# Patient Record
Sex: MALE | Race: BLACK OR AFRICAN AMERICAN | NOT HISPANIC OR LATINO | Employment: FULL TIME | URBAN - METROPOLITAN AREA
[De-identification: names, ages, dates, MRNs, and addresses within clinical notes are randomized per-mention and may not be internally consistent; named-entity substitution may affect disease eponyms.]

---

## 2017-09-21 ENCOUNTER — OFFICE VISIT (OUTPATIENT)
Dept: INTERNAL MEDICINE | Facility: CLINIC | Age: 37
End: 2017-09-21
Payer: COMMERCIAL

## 2017-09-21 ENCOUNTER — LAB VISIT (OUTPATIENT)
Dept: LAB | Facility: HOSPITAL | Age: 37
End: 2017-09-21
Attending: FAMILY MEDICINE
Payer: COMMERCIAL

## 2017-09-21 VITALS
TEMPERATURE: 99 F | RESPIRATION RATE: 16 BRPM | HEART RATE: 92 BPM | HEIGHT: 74 IN | BODY MASS INDEX: 28.71 KG/M2 | WEIGHT: 223.75 LBS | DIASTOLIC BLOOD PRESSURE: 74 MMHG | SYSTOLIC BLOOD PRESSURE: 129 MMHG

## 2017-09-21 DIAGNOSIS — Z00.00 ROUTINE MEDICAL EXAM: Primary | ICD-10-CM

## 2017-09-21 DIAGNOSIS — Z00.00 ROUTINE MEDICAL EXAM: ICD-10-CM

## 2017-09-21 DIAGNOSIS — J45.40 MODERATE PERSISTENT ASTHMA WITHOUT COMPLICATION: ICD-10-CM

## 2017-09-21 LAB
ALBUMIN SERPL BCP-MCNC: 4 G/DL
ALP SERPL-CCNC: 85 U/L
ALT SERPL W/O P-5'-P-CCNC: 22 U/L
ANION GAP SERPL CALC-SCNC: 10 MMOL/L
AST SERPL-CCNC: 20 U/L
BASOPHILS # BLD AUTO: 0.04 K/UL
BASOPHILS NFR BLD: 0.6 %
BILIRUB SERPL-MCNC: 1 MG/DL
BUN SERPL-MCNC: 12 MG/DL
CALCIUM SERPL-MCNC: 9.3 MG/DL
CHLORIDE SERPL-SCNC: 106 MMOL/L
CHOLEST SERPL-MCNC: 182 MG/DL
CHOLEST/HDLC SERPL: 3.5 {RATIO}
CO2 SERPL-SCNC: 24 MMOL/L
CREAT SERPL-MCNC: 1 MG/DL
DIFFERENTIAL METHOD: NORMAL
EOSINOPHIL # BLD AUTO: 0.3 K/UL
EOSINOPHIL NFR BLD: 4.4 %
ERYTHROCYTE [DISTWIDTH] IN BLOOD BY AUTOMATED COUNT: 14.1 %
EST. GFR  (AFRICAN AMERICAN): >60 ML/MIN/1.73 M^2
EST. GFR  (NON AFRICAN AMERICAN): >60 ML/MIN/1.73 M^2
GLUCOSE SERPL-MCNC: 85 MG/DL
HCT VFR BLD AUTO: 47.2 %
HDLC SERPL-MCNC: 52 MG/DL
HDLC SERPL: 28.6 %
HGB BLD-MCNC: 15.6 G/DL
LDLC SERPL CALC-MCNC: 102.6 MG/DL
LYMPHOCYTES # BLD AUTO: 2.4 K/UL
LYMPHOCYTES NFR BLD: 35.7 %
MCH RBC QN AUTO: 27.6 PG
MCHC RBC AUTO-ENTMCNC: 33.1 G/DL
MCV RBC AUTO: 83 FL
MONOCYTES # BLD AUTO: 0.6 K/UL
MONOCYTES NFR BLD: 8.6 %
NEUTROPHILS # BLD AUTO: 3.4 K/UL
NEUTROPHILS NFR BLD: 50.4 %
NONHDLC SERPL-MCNC: 130 MG/DL
PLATELET # BLD AUTO: 252 K/UL
PMV BLD AUTO: 10.1 FL
POTASSIUM SERPL-SCNC: 4.1 MMOL/L
PROT SERPL-MCNC: 7.6 G/DL
RBC # BLD AUTO: 5.66 M/UL
SODIUM SERPL-SCNC: 140 MMOL/L
T4 FREE SERPL-MCNC: 0.95 NG/DL
TRIGL SERPL-MCNC: 137 MG/DL
TSH SERPL DL<=0.005 MIU/L-ACNC: 1.49 UIU/ML
WBC # BLD AUTO: 6.75 K/UL

## 2017-09-21 PROCEDURE — 36415 COLL VENOUS BLD VENIPUNCTURE: CPT | Mod: PO

## 2017-09-21 PROCEDURE — 84439 ASSAY OF FREE THYROXINE: CPT

## 2017-09-21 PROCEDURE — 80053 COMPREHEN METABOLIC PANEL: CPT

## 2017-09-21 PROCEDURE — 85025 COMPLETE CBC W/AUTO DIFF WBC: CPT

## 2017-09-21 PROCEDURE — 84443 ASSAY THYROID STIM HORMONE: CPT

## 2017-09-21 PROCEDURE — 99999 PR PBB SHADOW E&M-NEW PATIENT-LVL III: CPT | Mod: PBBFAC,,, | Performed by: FAMILY MEDICINE

## 2017-09-21 PROCEDURE — 80061 LIPID PANEL: CPT

## 2017-09-21 PROCEDURE — 99385 PREV VISIT NEW AGE 18-39: CPT | Mod: S$GLB,,, | Performed by: FAMILY MEDICINE

## 2017-09-21 RX ORDER — KETOCONAZOLE 20 MG/G
CREAM TOPICAL DAILY
Qty: 30 G | Refills: 5 | Status: SHIPPED | OUTPATIENT
Start: 2017-09-21 | End: 2018-09-12 | Stop reason: ALTCHOICE

## 2017-09-21 RX ORDER — ALBUTEROL SULFATE 90 UG/1
2 AEROSOL, METERED RESPIRATORY (INHALATION) EVERY 6 HOURS PRN
Qty: 18 G | Refills: 5 | Status: SHIPPED | OUTPATIENT
Start: 2017-09-21 | End: 2018-09-19 | Stop reason: SDUPTHER

## 2017-09-22 ENCOUNTER — TELEPHONE (OUTPATIENT)
Dept: INTERNAL MEDICINE | Facility: CLINIC | Age: 37
End: 2017-09-22

## 2017-09-22 NOTE — TELEPHONE ENCOUNTER
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 Encourage seamus sign-up. thx

## 2017-09-22 NOTE — TELEPHONE ENCOUNTER
SHARLENEM to call clinic  Spoke with pt, advsied of lab results. Pt will sign up for SARAH to view labs.     Pt request a new RX for his mometasone. He needs 60 doses not 30. Pt would like to know if you will send in new rx for 60 doses so he doesn't run out half way through the month   Thanks

## 2017-09-22 NOTE — TELEPHONE ENCOUNTER
Spoke with zander, changed rx for mometasone from 30 doses to 60 doses per Dr Vincent. Pt informed

## 2017-09-22 NOTE — PROGRESS NOTES
Subjective:   Patient ID: Kaitlynn Chauhan is a 36 y.o. male.    Chief Complaint: Establish Care and Asthma      HPI  37 yo male here to est with pcp. Has mod persistent asthma and has found since moving to the area that he is using his albuterol MDI more and more. No cough now and no acute illness. No postnasal drip, pharyngitis, headache, fever, etc.    Patient queried and denies any further complaints    PREVENTIVE MED  Diet--good  Exercise--good  Colorectal Ca--NA for age.  Lung ca--NA  Alcohol use--rare  Tobacco--does not use  BP--cont to monitor  Depression--none  Type 2 DM--screen  Hep C--screen  Lipids--screen  HIV--not at risk  Prostate --na  STD--not at risk  TB--not at risk  Vision--check annually        PAST MEDICAL HISTORY:  History reviewed. No pertinent past medical history.    PAST SURGICAL HISTORY:  History reviewed. No pertinent surgical history.    SOCIAL HISTORY:  Social History     Social History    Marital status: Single     Spouse name: N/A    Number of children: N/A    Years of education: N/A     Occupational History    Not on file.     Social History Main Topics    Smoking status: Never Smoker    Smokeless tobacco: Never Used    Alcohol use Not on file    Drug use: Unknown    Sexual activity: Not on file     Other Topics Concern    Not on file     Social History Narrative    No narrative on file       FAMILY HISTORY:  History reviewed. No pertinent family history.    ALLERGIES AND MEDICATIONS: updated and reviewed.  Review of patient's allergies indicates:  No Known Allergies    Current Outpatient Prescriptions:     ALBUTEROL SULFATE INHL, Inhale into the lungs daily as needed. , Disp: , Rfl:     albuterol 90 mcg/actuation inhaler, Inhale 2 puffs into the lungs every 6 (six) hours as needed for Wheezing., Disp: 18 g, Rfl: 5    ketoconazole (NIZORAL) 2 % cream, Apply topically once daily., Disp: 30 g, Rfl: 5    mometasone 220 mcg (30 doses) inhaler, Inhale 2 puffs into the lungs  "once daily. Controller, Disp: 1 each, Rfl: 5    peak flow meter Heide, 1 Inhaler by Misc.(Non-Drug; Combo Route) route as needed., Disp: 1 each, Rfl: 99    Review of Systems   Constitutional: Negative for activity change, appetite change, chills, diaphoresis, fatigue, fever and unexpected weight change.   HENT: Negative for congestion, ear discharge, ear pain, postnasal drip, rhinorrhea, sneezing and sore throat.    Eyes: Negative for photophobia and discharge.   Respiratory: Positive for cough (not presently) and shortness of breath (mild and not presently). Negative for chest tightness and wheezing.    Cardiovascular: Negative for chest pain and palpitations.   Gastrointestinal: Negative for abdominal distention, abdominal pain, diarrhea, nausea and vomiting.   Genitourinary: Negative for dysuria.   Musculoskeletal: Negative for arthralgias and neck pain.   Skin: Negative for rash.   Neurological: Negative for headaches.       Objective:     Vitals:    09/21/17 1354   BP: 129/74   Pulse: 92   Resp: 16   Temp: 98.6 °F (37 °C)   TempSrc: Oral   Weight: 101.5 kg (223 lb 12.3 oz)   Height: 6' 2" (1.88 m)   PainSc: 0-No pain     Body mass index is 28.73 kg/m².    Physical Exam   Constitutional: He appears well-developed and well-nourished.   HENT:   Head: Normocephalic and atraumatic.   Right Ear: Hearing, tympanic membrane, external ear and ear canal normal. No drainage or swelling. No decreased hearing is noted.   Left Ear: Hearing, tympanic membrane, external ear and ear canal normal. No drainage or swelling. No decreased hearing is noted.   Nose: Nose normal. No rhinorrhea.   Mouth/Throat: Oropharynx is clear and moist. No oropharyngeal exudate, posterior oropharyngeal edema or posterior oropharyngeal erythema.   Eyes: Conjunctivae, EOM and lids are normal. Pupils are equal, round, and reactive to light. Right eye exhibits no discharge and no exudate. Left eye exhibits no discharge and no exudate. Right conjunctiva " is not injected. Left conjunctiva is not injected.   Neck: Trachea normal and full passive range of motion without pain. Normal carotid pulses, no hepatojugular reflux and no JVD present. Carotid bruit is not present. No neck rigidity. No edema and no erythema present. No thyroid mass and no thyromegaly present.   Cardiovascular: Normal rate, regular rhythm and normal heart sounds.    Pulmonary/Chest: Effort normal. No respiratory distress.   Abdominal: Soft. Normal appearance and bowel sounds are normal. There is no tenderness. There is negative Damon's sign.   Lymphadenopathy:     He has no cervical adenopathy.   Neurological: He is alert.   Skin: Skin is warm and dry.   Psychiatric: He has a normal mood and affect. His speech is normal and behavior is normal.       Assessment and Plan:   Kaitlynn was seen today for establish care and asthma.    Diagnoses and all orders for this visit:    Routine medical exam  -     CBC auto differential; Future  -     Comprehensive metabolic panel; Future  -     Lipid panel; Future  -     TSH; Future  -     T4, free; Future    Moderate persistent asthma without complication    Other orders  -     mometasone 220 mcg (30 doses) inhaler; Inhale 2 puffs into the lungs once daily. Controller  -     albuterol 90 mcg/actuation inhaler; Inhale 2 puffs into the lungs every 6 (six) hours as needed for Wheezing.  -     peak flow meter Heide; 1 Inhaler by Misc.(Non-Drug; Combo Route) route as needed.  -     ketoconazole (NIZORAL) 2 % cream; Apply topically once daily.        Return in about 6 months (around 3/21/2018).        THIS NOTE WILL BE SHARED WITH THE PATIENT.

## 2017-10-23 ENCOUNTER — PATIENT MESSAGE (OUTPATIENT)
Dept: INTERNAL MEDICINE | Facility: CLINIC | Age: 37
End: 2017-10-23

## 2017-10-24 ENCOUNTER — TELEPHONE (OUTPATIENT)
Dept: INTERNAL MEDICINE | Facility: CLINIC | Age: 37
End: 2017-10-24

## 2017-10-24 RX ORDER — FLUCONAZOLE 150 MG/1
150 TABLET ORAL DAILY
Qty: 5 TABLET | Refills: 0 | Status: SHIPPED | OUTPATIENT
Start: 2017-10-24 | End: 2017-10-25

## 2017-10-24 NOTE — TELEPHONE ENCOUNTER
"----- Message from Essence Leon sent at 10/24/2017  9:52 AM CDT -----  Contact: Pt [phone]  RX request - refill or new RX.  Is this a refill or new RX:  New  RX name and strength: diflucan  Directions:   Is this a 30 day or 90 day RX:    Pharmacy name and phone # (DON'T enter "on file" or "in chart"): [facility] [id number] [address] AT [facility]  Comments:  Pt was emailing Dr Vincent and stated that Dr Vincent thinks he called this in but it is not in the patients chart patient would like a call back from the nurse.       "

## 2017-10-24 NOTE — TELEPHONE ENCOUNTER
Spoke with Pt, advised I do see the e-mail between himself and Dr Vincent.pt informed Dr Vincent is out this week but Dr Hunter will be covering today and I will ask him to fill the Diflucan. Pt voiced understanding.  Will call pt once complete.

## 2018-05-14 ENCOUNTER — PATIENT MESSAGE (OUTPATIENT)
Dept: INTERNAL MEDICINE | Facility: CLINIC | Age: 38
End: 2018-05-14

## 2018-05-15 RX ORDER — FLUTICASONE PROPIONATE AND SALMETEROL 232; 14 UG/1; UG/1
1 POWDER, METERED RESPIRATORY (INHALATION) 2 TIMES DAILY
Qty: 60 EACH | Refills: 5 | Status: SHIPPED | OUTPATIENT
Start: 2018-05-15 | End: 2018-05-16

## 2018-05-16 ENCOUNTER — PATIENT MESSAGE (OUTPATIENT)
Dept: INTERNAL MEDICINE | Facility: CLINIC | Age: 38
End: 2018-05-16

## 2018-05-16 RX ORDER — MONTELUKAST SODIUM 10 MG/1
10 TABLET ORAL NIGHTLY
Qty: 30 TABLET | Refills: 1 | Status: SHIPPED | OUTPATIENT
Start: 2018-05-16 | End: 2018-06-15

## 2018-09-05 ENCOUNTER — OFFICE VISIT (OUTPATIENT)
Dept: URGENT CARE | Facility: CLINIC | Age: 38
End: 2018-09-05
Payer: COMMERCIAL

## 2018-09-05 VITALS
BODY MASS INDEX: 28.23 KG/M2 | DIASTOLIC BLOOD PRESSURE: 96 MMHG | SYSTOLIC BLOOD PRESSURE: 129 MMHG | RESPIRATION RATE: 16 BRPM | HEART RATE: 87 BPM | WEIGHT: 220 LBS | OXYGEN SATURATION: 98 % | TEMPERATURE: 98 F | HEIGHT: 74 IN

## 2018-09-05 DIAGNOSIS — R94.31 ABNORMAL EKG: Primary | ICD-10-CM

## 2018-09-05 DIAGNOSIS — I49.3 PVC (PREMATURE VENTRICULAR CONTRACTION): ICD-10-CM

## 2018-09-05 PROCEDURE — 3008F BODY MASS INDEX DOCD: CPT | Mod: CPTII,S$GLB,, | Performed by: NURSE PRACTITIONER

## 2018-09-05 PROCEDURE — 99213 OFFICE O/P EST LOW 20 MIN: CPT | Mod: S$GLB,,, | Performed by: NURSE PRACTITIONER

## 2018-09-05 NOTE — PROGRESS NOTES
"Subjective:       Patient ID: Kaitlynn Chauhan is a 37 y.o. male.    Vitals:  height is 6' 2" (1.88 m) and weight is 99.8 kg (220 lb). His temperature is 98.2 °F (36.8 °C). His blood pressure is 129/96 (abnormal) and his pulse is 87. His respiration is 16 and oxygen saturation is 98%.     Chief Complaint: No chief complaint on file.    Pt states he feels like he has been having heart palpations for the last couple of weeks, haS been getting worse and more frequent over the last few days.  Pt denies any chest pain, dizziness, lightheadedness or shortness of breath associated with the palpitations. 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       Review of Systems   Constitution: Negative for chills and fever.   HENT: Negative for sore throat.    Eyes: Negative for blurred vision.   Cardiovascular: Negative for chest pain.   Respiratory: Negative for shortness of breath.    Skin: Negative for rash.   Musculoskeletal: Negative for back pain and joint pain.   Gastrointestinal: Negative for abdominal pain, diarrhea, nausea and vomiting.   Neurological: Negative for headaches.   Psychiatric/Behavioral: The patient is not nervous/anxious.        Objective:      Physical Exam   Constitutional: He is oriented to person, place, and time. He appears well-developed and well-nourished. He is cooperative.  Non-toxic appearance. He does not appear ill. No distress.   HENT:   Head: Normocephalic and atraumatic.   Right Ear: Hearing, tympanic membrane, external ear and ear canal normal.   Left Ear: Hearing, tympanic membrane, external ear and ear canal normal.   Nose: Nose normal. No mucosal edema, rhinorrhea or nasal deformity. No epistaxis. Right sinus exhibits no maxillary sinus tenderness and no frontal sinus tenderness. Left sinus exhibits no maxillary sinus tenderness and no frontal sinus tenderness.   Mouth/Throat: Uvula is midline, " oropharynx is clear and moist and mucous membranes are normal. No trismus in the jaw. Normal dentition. No uvula swelling. No posterior oropharyngeal erythema.   Eyes: Conjunctivae and lids are normal. Right eye exhibits no discharge. Left eye exhibits no discharge. No scleral icterus.   Sclera clear bilat   Neck: Trachea normal, normal range of motion, full passive range of motion without pain and phonation normal. Neck supple.   Cardiovascular: Normal rate, regular rhythm, normal heart sounds, intact distal pulses and normal pulses. Exam reveals no decreased pulses.   No murmur heard.  Pulmonary/Chest: Effort normal and breath sounds normal. No stridor. No respiratory distress. He has no decreased breath sounds. He has no wheezes.   Abdominal: Soft. Normal appearance and bowel sounds are normal. He exhibits no distension, no pulsatile midline mass and no mass. There is no tenderness.   Musculoskeletal: Normal range of motion. He exhibits no edema or deformity.   Neurological: He is alert and oriented to person, place, and time. He exhibits normal muscle tone. Coordination normal.   Skin: Skin is warm, dry and intact. He is not diaphoretic. No pallor.   Psychiatric: He has a normal mood and affect. His speech is normal and behavior is normal. Judgment and thought content normal. Cognition and memory are normal.   Nursing note and vitals reviewed.      EKG- PVC present, no ST segment changes, heart rate 77, no previous ekg for comparison   Assessment:       1. Abnormal EKG    2. PVC (premature ventricular contraction)        Plan:         Abnormal EKG  -     Ambulatory referral to Cardiology    PVC (premature ventricular contraction)  -     Ambulatory referral to Cardiology    Discussed case with Dr. Jacques. Pt was told to follow up with cardiology for work up and possible halter monitor. The importance of avoiding caffeine, decongestants and any sort of stimulant was stressed. Pt understood  Patient Instructions          AVOID CAFFEINE COMPLETELY     AVOID DECONGESTANTS     CALL [phone] TO SET UP APT WITH CARDIOLOGY      Understanding Premature Ventricular Contractions (PVCs)  Premature ventricular contractions (PVCs) are a type of abnormal heartbeat (arrhythmia). They are very common. They can occur in people of all ages from time to time. They usually cause only mild symptoms.  How PVCs happen    Your heart has 4 chambers: 2 upper atria and 2 lower ventricles. Normally, a special group of cells begins the signal to start your heartbeat. These cells are in the sinoatrial (SA) node in the right atrium. The signal quickly travels down your hearts conducting system. It travels to the left and right ventricle. As it travels, the signal triggers nearby parts of your heart to contract. This allows your heart to squeeze in a coordinated way.  During a premature ventricular contraction, the signal to start your heartbeat instead comes from one of the ventricles. This signal is premature, meaning it happens before the SA node has had a chance to fire. The signal spreads through the rest of your heart, causing a heartbeat. If this happens very soon after the previous heartbeat, your heart will push out very little blood. This causes a feeling of a pause between beats. If it happens a little later, your heart pushes out an almost normal amount of blood. This leads to a feeling of an extra heartbeat. So, the heart has a premature heartbeat in between normal heartbeats.  What causes PVCs?  Certain things can help set off a premature signal in the ventricles. These include:  · Reduced blood flow to your heart  · Scarring after a heart attack (myocardial infarction)  · Electrolyte problems, such as low sodium or potassium levels  · Increased adrenaline, such as with anxiety  · Certain medicines, like digoxin  Many heart conditions raise the risk for PVCs. These include:  · Mitral valve prolapse  · High blood pressure  · Heart  attack  · Coronary heart disease  · cardiomyopathy  · Hypertrophic cardiomyopathy  · Congenital heart disease  They often happen in people without any heart disease. However, PVCs are somewhat more common in people with some kind of heart disease.  Symptoms of PVCs  Most people with occasional PVCs dont have symptoms. You are also more likely to have symptoms if you have PVCs often. When symptoms do happen, they are usually minor. Symptoms may include:  · An awareness of the heart beating  · A fluttering or flip-flop feeling in your chest  · Feeling of a skipped or extra heartbeat  · Dizziness and near-fainting  · A pulsing sensation in the neck  PVCs may cause more severe symptoms if you have another heart problem, such as heart failure.  Diagnosing PVCs  Your healthcare provider will ask about your health history and give you a physical exam. An electrocardiogram (ECG) is the main test for diagnosis. This test allows your provider to look at the signal of your heartbeat for a brief time. Any PVCs that occur during this time will show up on the ECG. In some cases, your healthcare provider might advise ECG monitoring over a day or more, up to 30 days. This can help to catch PVCs that dont happen often. This is done with a monitor you wear night and day for the test period.  These may be the only tests your healthcare provider will need. You may need more testing if you have PVCs often, or many in a row. Your provider may look at other causes, including possible heart problems. These tests might include:  · Echocardiography, to look at your hearts structure and function  · Cardiac stress testing, to see how your heart responds to exercise and to evaluate blood flow through your heart  · Blood tests, to check potassium and thyroid levels  Date Last Reviewed: 2/17/2015  © 6404-1717 [employer]. [address] [place] [address], [place] [address]. All rights reserved. This information is not intended as a  substitute for professional medical care. Always follow your healthcare professional's instructions.        Treatment for Premature Ventricular Contractions (PVCs)  Premature ventricular contractions (PVCs) are a type of abnormal heartbeat (arrhythmia). They are very common. They can occur in people of all ages from time to time. They usually cause only mild symptoms.  Types of treatment   Most people with PVCs dont need any treatment. If you are treated for another problem with your heart, your PVCs may decrease. For example, you might take a medicine to lower your blood pressure. This may lower your rate of PVCs.  In some cases, specific treatment may be done to help prevent PVCs. These are used only if you have symptoms from PVCs. Choices include:  · Medicines called beta-blockers  · Other medicines to help prevent arrhythmias  · Catheter ablation, a procedure to destroy the cells in the heart causing the abnormal beats  Living with PVCs  Your healthcare provider may give your more instructions about how to manage your PVCs, such as:  · Eating a heart-healthy diet  · Getting enough exercise  · Maintaining a healthy weight  · Not consuming too much alcohol or caffeine, which can trigger PVCs  · Avoiding too much stress and fatigue, which can also trigger PVCs  · Getting treatment for your other health conditions, such as high blood pressure  · Making sure to keep all your medical appointments  When to call your healthcare provider  Call your healthcare provider right away if you have any of these:  · Symptoms that get worse over time  · Severe symptoms such as chest pain, near-fainting, or sudden shortness of breath   Date Last Reviewed: 8/10/2015  © 4699-0243 [employer]. [address] [place] [address], [place] [address]. All rights reserved. This information is not intended as a substitute for professional medical care. Always follow your healthcare professional's instructions.

## 2018-09-05 NOTE — PATIENT INSTRUCTIONS
AVOID CAFFEINE COMPLETELY     AVOID DECONGESTANTS     CALL [phone] TO SET UP APT WITH CARDIOLOGY      Understanding Premature Ventricular Contractions (PVCs)  Premature ventricular contractions (PVCs) are a type of abnormal heartbeat (arrhythmia). They are very common. They can occur in people of all ages from time to time. They usually cause only mild symptoms.  How PVCs happen    Your heart has 4 chambers: 2 upper atria and 2 lower ventricles. Normally, a special group of cells begins the signal to start your heartbeat. These cells are in the sinoatrial (SA) node in the right atrium. The signal quickly travels down your hearts conducting system. It travels to the left and right ventricle. As it travels, the signal triggers nearby parts of your heart to contract. This allows your heart to squeeze in a coordinated way.  During a premature ventricular contraction, the signal to start your heartbeat instead comes from one of the ventricles. This signal is premature, meaning it happens before the SA node has had a chance to fire. The signal spreads through the rest of your heart, causing a heartbeat. If this happens very soon after the previous heartbeat, your heart will push out very little blood. This causes a feeling of a pause between beats. If it happens a little later, your heart pushes out an almost normal amount of blood. This leads to a feeling of an extra heartbeat. So, the heart has a premature heartbeat in between normal heartbeats.  What causes PVCs?  Certain things can help set off a premature signal in the ventricles. These include:  · Reduced blood flow to your heart  · Scarring after a heart attack (myocardial infarction)  · Electrolyte problems, such as low sodium or potassium levels  · Increased adrenaline, such as with anxiety  · Certain medicines, like digoxin  Many heart conditions raise the risk for PVCs. These include:  · Mitral valve prolapse  · High blood pressure  · Heart  attack  · Coronary heart disease  · cardiomyopathy  · Hypertrophic cardiomyopathy  · Congenital heart disease  They often happen in people without any heart disease. However, PVCs are somewhat more common in people with some kind of heart disease.  Symptoms of PVCs  Most people with occasional PVCs dont have symptoms. You are also more likely to have symptoms if you have PVCs often. When symptoms do happen, they are usually minor. Symptoms may include:  · An awareness of the heart beating  · A fluttering or flip-flop feeling in your chest  · Feeling of a skipped or extra heartbeat  · Dizziness and near-fainting  · A pulsing sensation in the neck  PVCs may cause more severe symptoms if you have another heart problem, such as heart failure.  Diagnosing PVCs  Your healthcare provider will ask about your health history and give you a physical exam. An electrocardiogram (ECG) is the main test for diagnosis. This test allows your provider to look at the signal of your heartbeat for a brief time. Any PVCs that occur during this time will show up on the ECG. In some cases, your healthcare provider might advise ECG monitoring over a day or more, up to 30 days. This can help to catch PVCs that dont happen often. This is done with a monitor you wear night and day for the test period.  These may be the only tests your healthcare provider will need. You may need more testing if you have PVCs often, or many in a row. Your provider may look at other causes, including possible heart problems. These tests might include:  · Echocardiography, to look at your hearts structure and function  · Cardiac stress testing, to see how your heart responds to exercise and to evaluate blood flow through your heart  · Blood tests, to check potassium and thyroid levels  Date Last Reviewed: 2/17/2015  © 6135-2882 [employer]. [address] [place] [address], [place] [address]. All rights reserved. This information is not intended as a  substitute for professional medical care. Always follow your healthcare professional's instructions.        Treatment for Premature Ventricular Contractions (PVCs)  Premature ventricular contractions (PVCs) are a type of abnormal heartbeat (arrhythmia). They are very common. They can occur in people of all ages from time to time. They usually cause only mild symptoms.  Types of treatment   Most people with PVCs dont need any treatment. If you are treated for another problem with your heart, your PVCs may decrease. For example, you might take a medicine to lower your blood pressure. This may lower your rate of PVCs.  In some cases, specific treatment may be done to help prevent PVCs. These are used only if you have symptoms from PVCs. Choices include:  · Medicines called beta-blockers  · Other medicines to help prevent arrhythmias  · Catheter ablation, a procedure to destroy the cells in the heart causing the abnormal beats  Living with PVCs  Your healthcare provider may give your more instructions about how to manage your PVCs, such as:  · Eating a heart-healthy diet  · Getting enough exercise  · Maintaining a healthy weight  · Not consuming too much alcohol or caffeine, which can trigger PVCs  · Avoiding too much stress and fatigue, which can also trigger PVCs  · Getting treatment for your other health conditions, such as high blood pressure  · Making sure to keep all your medical appointments  When to call your healthcare provider  Call your healthcare provider right away if you have any of these:  · Symptoms that get worse over time  · Severe symptoms such as chest pain, near-fainting, or sudden shortness of breath   Date Last Reviewed: 8/10/2015  © 0187-0720 [employer]. [address] [place] [address], [place] [address]. All rights reserved. This information is not intended as a substitute for professional medical care. Always follow your healthcare professional's instructions.

## 2018-09-12 ENCOUNTER — CLINICAL SUPPORT (OUTPATIENT)
Dept: CARDIOLOGY | Facility: CLINIC | Age: 38
End: 2018-09-12
Attending: INTERNAL MEDICINE
Payer: COMMERCIAL

## 2018-09-12 ENCOUNTER — OFFICE VISIT (OUTPATIENT)
Dept: CARDIOLOGY | Facility: CLINIC | Age: 38
End: 2018-09-12
Payer: COMMERCIAL

## 2018-09-12 ENCOUNTER — LAB VISIT (OUTPATIENT)
Dept: LAB | Facility: HOSPITAL | Age: 38
End: 2018-09-12
Attending: INTERNAL MEDICINE
Payer: COMMERCIAL

## 2018-09-12 VITALS
HEIGHT: 74 IN | DIASTOLIC BLOOD PRESSURE: 78 MMHG | HEART RATE: 66 BPM | SYSTOLIC BLOOD PRESSURE: 120 MMHG | WEIGHT: 221.25 LBS | BODY MASS INDEX: 28.4 KG/M2

## 2018-09-12 DIAGNOSIS — R00.2 PALPITATIONS: ICD-10-CM

## 2018-09-12 DIAGNOSIS — R00.2 PALPITATIONS: Primary | ICD-10-CM

## 2018-09-12 DIAGNOSIS — Z91.89 EXCESSIVE COFFEE CONSUMPTION: ICD-10-CM

## 2018-09-12 DIAGNOSIS — I45.9 SKIPPED BEATS: ICD-10-CM

## 2018-09-12 LAB
ALBUMIN SERPL BCP-MCNC: 4 G/DL
ALP SERPL-CCNC: 81 U/L
ALT SERPL W/O P-5'-P-CCNC: 28 U/L
ANION GAP SERPL CALC-SCNC: 7 MMOL/L
AST SERPL-CCNC: 22 U/L
BILIRUB SERPL-MCNC: 1 MG/DL
BUN SERPL-MCNC: 15 MG/DL
CALCIUM SERPL-MCNC: 9.4 MG/DL
CHLORIDE SERPL-SCNC: 107 MMOL/L
CO2 SERPL-SCNC: 26 MMOL/L
CREAT SERPL-MCNC: 0.9 MG/DL
EST. GFR  (AFRICAN AMERICAN): >60 ML/MIN/1.73 M^2
EST. GFR  (NON AFRICAN AMERICAN): >60 ML/MIN/1.73 M^2
GLUCOSE SERPL-MCNC: 79 MG/DL
MAGNESIUM SERPL-MCNC: 2.3 MG/DL
POTASSIUM SERPL-SCNC: 4.2 MMOL/L
PROT SERPL-MCNC: 7.5 G/DL
SODIUM SERPL-SCNC: 140 MMOL/L
TSH SERPL DL<=0.005 MIU/L-ACNC: 1.09 UIU/ML

## 2018-09-12 PROCEDURE — 99204 OFFICE O/P NEW MOD 45 MIN: CPT | Mod: S$GLB,,, | Performed by: INTERNAL MEDICINE

## 2018-09-12 PROCEDURE — 36415 COLL VENOUS BLD VENIPUNCTURE: CPT | Mod: PO

## 2018-09-12 PROCEDURE — 84443 ASSAY THYROID STIM HORMONE: CPT

## 2018-09-12 PROCEDURE — 3008F BODY MASS INDEX DOCD: CPT | Mod: CPTII,S$GLB,, | Performed by: INTERNAL MEDICINE

## 2018-09-12 PROCEDURE — 99999 PR PBB SHADOW E&M-EST. PATIENT-LVL III: CPT | Mod: PBBFAC,,, | Performed by: INTERNAL MEDICINE

## 2018-09-12 PROCEDURE — 80053 COMPREHEN METABOLIC PANEL: CPT

## 2018-09-12 PROCEDURE — 83735 ASSAY OF MAGNESIUM: CPT

## 2018-09-12 PROCEDURE — 93000 ELECTROCARDIOGRAM COMPLETE: CPT | Mod: S$GLB,,, | Performed by: INTERNAL MEDICINE

## 2018-09-12 PROCEDURE — 93224 XTRNL ECG REC UP TO 48 HRS: CPT | Mod: ,,, | Performed by: INTERNAL MEDICINE

## 2018-09-12 RX ORDER — FLUTICASONE PROPIONATE AND SALMETEROL 232; 14 UG/1; UG/1
POWDER, METERED RESPIRATORY (INHALATION) DAILY
Refills: 5 | COMMUNITY
Start: 2018-08-31 | End: 2018-09-19 | Stop reason: SDUPTHER

## 2018-09-12 NOTE — LETTER
September 12, 2018      Ranjan Roach, NP  [address] [facility] [address]           [facility] - Cardiology  [address] [facility] [address]  Phone: [phone]          Patient: Kaitlynn Chauhan   MR Number: [id number]   YOB: 1980   Date of Visit: 9/12/2018       Dear Ranjan Roach:    Thank you for referring Kaitlynn Chauhan to me for evaluation. Attached you will find relevant portions of my assessment and plan of care.    If you have questions, please do not hesitate to call me. I look forward to following Kaitlynn Chauhan along with you.    Sincerely,    Lois Borja MD    Enclosure  CC:  No Recipients    If you would like to receive this communication electronically, please contact [email or web address] or [phone] to request more information on [employer] access.    For providers and/or their staff who would like to refer a patient to [facility], please contact us through our one-stop-shop provider referral line, Michael Fuentes, at [phone].    If you feel you have received this communication in error or would no longer like to receive these types of communications, please e-mail [email or web address]

## 2018-09-12 NOTE — PROGRESS NOTES
48 Holter monitor placed as ordered. Patient and/or family verbalized understanding of instructions.

## 2018-09-14 ENCOUNTER — PATIENT MESSAGE (OUTPATIENT)
Dept: CARDIOLOGY | Facility: CLINIC | Age: 38
End: 2018-09-14

## 2018-09-14 ENCOUNTER — TELEPHONE (OUTPATIENT)
Dept: CARDIOLOGY | Facility: CLINIC | Age: 38
End: 2018-09-14

## 2018-09-15 NOTE — TELEPHONE ENCOUNTER
Results of recent labs given to patient through my [facility].    Notes recorded by Lois Borja MD on 9/14/2018 at 3:39 PM CDT  Please let patient know that all his labs are normal

## 2018-09-19 ENCOUNTER — OFFICE VISIT (OUTPATIENT)
Dept: INTERNAL MEDICINE | Facility: CLINIC | Age: 38
End: 2018-09-19
Payer: COMMERCIAL

## 2018-09-19 VITALS
HEIGHT: 74 IN | DIASTOLIC BLOOD PRESSURE: 93 MMHG | WEIGHT: 224 LBS | SYSTOLIC BLOOD PRESSURE: 121 MMHG | TEMPERATURE: 98 F | BODY MASS INDEX: 28.75 KG/M2 | HEART RATE: 65 BPM | OXYGEN SATURATION: 96 %

## 2018-09-19 DIAGNOSIS — J45.40 MODERATE PERSISTENT ASTHMA WITHOUT COMPLICATION: ICD-10-CM

## 2018-09-19 DIAGNOSIS — L03.316 NAVEL CELLULITIS: Primary | ICD-10-CM

## 2018-09-19 DIAGNOSIS — R21 RASH OF NECK: ICD-10-CM

## 2018-09-19 DIAGNOSIS — R21 RASH: ICD-10-CM

## 2018-09-19 PROCEDURE — 99999 PR PBB SHADOW E&M-EST. PATIENT-LVL IV: CPT | Mod: PBBFAC,,, | Performed by: FAMILY MEDICINE

## 2018-09-19 PROCEDURE — 99214 OFFICE O/P EST MOD 30 MIN: CPT | Mod: S$GLB,,, | Performed by: FAMILY MEDICINE

## 2018-09-19 PROCEDURE — 3008F BODY MASS INDEX DOCD: CPT | Mod: CPTII,S$GLB,, | Performed by: FAMILY MEDICINE

## 2018-09-19 RX ORDER — ALBUTEROL SULFATE 90 UG/1
2 AEROSOL, METERED RESPIRATORY (INHALATION) EVERY 6 HOURS PRN
Qty: 18 G | Refills: 5 | Status: SHIPPED | OUTPATIENT
Start: 2018-09-19 | End: 2019-09-19

## 2018-09-19 RX ORDER — SULFAMETHOXAZOLE AND TRIMETHOPRIM 800; 160 MG/1; MG/1
1 TABLET ORAL 2 TIMES DAILY
Qty: 28 TABLET | Refills: 0 | Status: SHIPPED | OUTPATIENT
Start: 2018-09-19 | End: 2019-04-05

## 2018-09-19 RX ORDER — FLUTICASONE PROPIONATE AND SALMETEROL 232; 14 UG/1; UG/1
2 POWDER, METERED RESPIRATORY (INHALATION) DAILY
Qty: 60 EACH | Refills: 5 | Status: SHIPPED | OUTPATIENT
Start: 2018-09-19 | End: 2018-10-02 | Stop reason: ALTCHOICE

## 2018-09-20 PROBLEM — J45.40 MODERATE PERSISTENT ASTHMA WITHOUT COMPLICATION: Status: ACTIVE | Noted: 2018-09-20

## 2018-09-20 NOTE — PROGRESS NOTES
Subjective:   Patient ID: Kaitlynn Chauhan is a 37 y.o. male.    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 Had problem several years ago. Not holly painful. No abd pain. No nvd. No fevers or chills.     Patient queried and denies any further complaints.      ALLERGIES AND MEDICATIONS: updated and reviewed.  Review of patient's allergies indicates:  No Known Allergies    Current Outpatient Medications:     albuterol (PROVENTIL/VENTOLIN HFA) 90 mcg/actuation inhaler, Inhale 2 puffs into the lungs every 6 (six) hours as needed for Wheezing., Disp: 18 g, Rfl: 5    ALBUTEROL SULFATE INHL, Inhale into the lungs daily as needed. , Disp: , Rfl:     fluticasone-salmeterol 232-14 mcg/actuation AePB, Inhale 2 puffs into the lungs once daily., Disp: 60 each, Rfl: 5    mometasone (ASMANEX TWISTHALER) 220 mcg (30 doses) inhaler, Inhale 2 puffs into the lungs once daily. Controller, Disp: 1 each, Rfl: 11    sulfamethoxazole-trimethoprim 800-160mg (BACTRIM DS) 800-160 mg Tab, Take 1 tablet by mouth 2 (two) times daily., Disp: 28 tablet, Rfl: 0    Review of Systems   Constitutional: Negative for activity change and unexpected weight change.   HENT: Negative for hearing loss, rhinorrhea and trouble swallowing.    Eyes: Negative for discharge and visual disturbance.   Respiratory: Negative for chest tightness and wheezing.    Cardiovascular: Positive for palpitations. Negative for chest pain.   Gastrointestinal: Negative for blood in stool, constipation, diarrhea and vomiting.   Endocrine: Negative for polydipsia and polyuria.   Genitourinary: Negative for difficulty urinating, hematuria and urgency.   Musculoskeletal: Negative for arthralgias, joint swelling and neck pain.   Skin: Positive for wound.   Neurological: Negative for weakness and headaches.   Psychiatric/Behavioral: Negative for confusion and dysphoric  "mood.       Objective:     Vitals:    09/19/18 1500   BP: (!) 121/93   Pulse: 65   Temp: 98.4 °F (36.9 °C)   TempSrc: Oral   SpO2: 96%   Weight: 101.6 kg (223 lb 15.8 oz)   Height: 6' 2" (1.88 m)   PainSc: 0-No pain     Body mass index is 28.76 kg/m².    Physical Exam   Constitutional: He is oriented to person, place, and time. He appears well-developed and well-nourished.   HENT:   Head: Normocephalic and atraumatic.   Neurological: He is alert and oriented to person, place, and time.   Skin:        Vitals reviewed.      Assessment and Plan:   Kaitlynn was seen today for navel discharge.    Diagnoses and all orders for this visit:    Navel cellulitis  -     Ambulatory consult to General Surgery    Rash of neck    Rash  -     Ambulatory Referral to Dermatology    Moderate persistent asthma without complication    Other orders  -     sulfamethoxazole-trimethoprim 800-160mg (BACTRIM DS) 800-160 mg Tab; Take 1 tablet by mouth 2 (two) times daily.  -     fluticasone-salmeterol 232-14 mcg/actuation AePB; Inhale 2 puffs into the lungs once daily.  -     albuterol (PROVENTIL/VENTOLIN HFA) 90 mcg/actuation inhaler; Inhale 2 puffs into the lungs every 6 (six) hours as needed for Wheezing.  -     mometasone (ASMANEX TWISTHALER) 220 mcg (30 doses) inhaler; Inhale 2 puffs into the lungs once daily. Controller        Follow-up in about 2 weeks (around 10/3/2018).    THIS NOTE WILL BE SHARED WITH THE PATIENT.        "

## 2018-10-02 ENCOUNTER — OFFICE VISIT (OUTPATIENT)
Dept: SURGERY | Facility: CLINIC | Age: 38
End: 2018-10-02
Payer: COMMERCIAL

## 2018-10-02 VITALS
TEMPERATURE: 97 F | HEIGHT: 74 IN | WEIGHT: 223.75 LBS | HEART RATE: 67 BPM | DIASTOLIC BLOOD PRESSURE: 91 MMHG | BODY MASS INDEX: 28.71 KG/M2 | SYSTOLIC BLOOD PRESSURE: 124 MMHG

## 2018-10-02 DIAGNOSIS — E66.3 OVERWEIGHT (BMI 25.0-29.9): ICD-10-CM

## 2018-10-02 DIAGNOSIS — Q64.4 URACHAL SINUS: Primary | ICD-10-CM

## 2018-10-02 DIAGNOSIS — Q64.4 URACHAL CYST: ICD-10-CM

## 2018-10-02 DIAGNOSIS — S31.105A OPEN WOUND OF UMBILICAL REGION, INITIAL ENCOUNTER: ICD-10-CM

## 2018-10-02 LAB
GRAM STN SPEC: NORMAL
GRAM STN SPEC: NORMAL

## 2018-10-02 PROCEDURE — 87070 CULTURE OTHR SPECIMN AEROBIC: CPT

## 2018-10-02 PROCEDURE — 3008F BODY MASS INDEX DOCD: CPT | Mod: CPTII,S$GLB,, | Performed by: SURGERY

## 2018-10-02 PROCEDURE — 99204 OFFICE O/P NEW MOD 45 MIN: CPT | Mod: S$GLB,,, | Performed by: SURGERY

## 2018-10-02 PROCEDURE — 87075 CULTR BACTERIA EXCEPT BLOOD: CPT

## 2018-10-02 PROCEDURE — 87205 SMEAR GRAM STAIN: CPT

## 2018-10-02 PROCEDURE — 87076 CULTURE ANAEROBE IDENT EACH: CPT

## 2018-10-02 PROCEDURE — 99999 PR PBB SHADOW E&M-EST. PATIENT-LVL IV: CPT | Mod: PBBFAC,,, | Performed by: SURGERY

## 2018-10-02 NOTE — LETTER
October 2, 2018      Clarke Vincent MD  [address]  [address]th [address]  [place] [address]           [facility]  [address]  [address] [facility] [address]  Phone: [phone]          Patient: Kaitlynn Chauhan   MR Number: [id number]   YOB: 1980   Date of Visit: 10/2/2018       Dear Dr. Clarke Vincent:    Thank you for referring Kaitlynn Chauhan to me for evaluation. Attached you will find relevant portions of my assessment and plan of care.    If you have questions, please do not hesitate to call me. I look forward to following Kaitlynn Chauhan along with you.    Sincerely,    Kailey Whitmore, DO    Enclosure  CC:  No Recipients    If you would like to receive this communication electronically, please contact [email or web address] or [phone] to request more information on [employer] access.    For providers and/or their staff who would like to refer a patient to [facility], please contact us through our one-stop-shop provider referral line, [facility], at [phone].    If you feel you have received this communication in error or would no longer like to receive these types of communications, please e-mail [email or web address]

## 2018-10-02 NOTE — PROGRESS NOTES
Subjective:      Patient ID: Kaitlynn Chauhan is a 37 y.o. male.    Chief Complaint: Consult (drainage from naval)   Patient presents for evaluation of draining wound from the umbilicus. He Has h/o draining from umbilicus 10yr ago.  He sought surgical evaluation at that time and reports that exploratory laparotomy was recommended.  Subsequently the wound then spontaneously resolved.  He did not have any trouble until this past summer when the wound began draining again (July 2018).  The character of the drainage sometimes bloody, serous-sanguinous, or serous. Not painful. No f/c. No n/v. Started Bactrim for the drainage ~1wk ago.  No improvement. 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 Has h/o asthma, well controlled, uses rescue inhaler twice / month.  No other complaints.  He is unsure if he is interested in surgical intervention at this time.  He would like additional workup.  He has not had any imaging.    Past Medical History:   Diagnosis Date    Asthma      History reviewed. No pertinent surgical history.  History reviewed. No pertinent family history.  Social History     Socioeconomic History    Marital status: Single     Spouse name: None    Number of children: None    Years of education: None    Highest education level: None   Social Needs    Financial resource strain: None    Food insecurity - worry: None    Food insecurity - inability: None    Transportation needs - medical: None    Transportation needs - non-medical: None   Occupational History    None   Tobacco Use    Smoking status: Never Smoker    Smokeless tobacco: Never Used   Substance and Sexual Activity    Alcohol use: Yes     Comment: socially    Drug use: No    Sexual activity: None   Other Topics Concern    None   Social History Narrative    None       Current Outpatient Medications   Medication Sig Dispense Refill    albuterol (PROVENTIL/VENTOLIN HFA) 90 mcg/actuation inhaler Inhale 2 puffs into the  "lungs every 6 (six) hours as needed for Wheezing. 18 g 5    mometasone (ASMANEX TWISTHALER) 220 mcg (30 doses) inhaler Inhale 2 puffs into the lungs once daily. Controller 1 each 11    sulfamethoxazole-trimethoprim 800-160mg (BACTRIM DS) 800-160 mg Tab Take 1 tablet by mouth 2 (two) times daily. 28 tablet 0     No current facility-administered medications for this visit.      Review of patient's allergies indicates:  No Known Allergies    ROS:  All systems were reviewed and are negative, except that mentioned in the HPI.    Objective:     Vitals:    10/02/18 1335   BP: (!) 124/91   Pulse: 67   Temp: 97.2 °F (36.2 °C)   Weight: 101.5 kg (223 lb 12.3 oz)   Height: 6' 2" (1.88 m)     Physical Exam   Constitutional: He is oriented to person, place, and time. He appears well-developed and well-nourished. No distress.   HENT:   Head: Normocephalic and atraumatic.   Eyes: Conjunctivae and EOM are normal. Pupils are equal, round, and reactive to light. No scleral icterus.   Neck: Normal range of motion. Neck supple. No JVD present.   Cardiovascular: Normal rate and regular rhythm.   Pulmonary/Chest: Effort normal and breath sounds normal. No respiratory distress.   Abdominal: Soft. Bowel sounds are normal. He exhibits no distension. There is tenderness. There is no rebound and no guarding (Within umbilicus).   Unable to determine if there is an umbilical hernia due to the tenderness of umbilical exam.  The umbilical tissue was retracted with cotton tip swabs and an open mucosal-like opening or lesion was noted at the base.  This was very tender.   Musculoskeletal: Normal range of motion. He exhibits no edema or tenderness.   Neurological: He is alert and oriented to person, place, and time. No cranial nerve deficit.   Skin: Skin is warm and dry. He is not diaphoretic.   Psychiatric: He has a normal mood and affect.       Lab Review: CBC:   Lab Results   Component Value Date    WBC 6.75 09/21/2017    RBC 5.66 09/21/2017    " HGB 15.6 09/21/2017    HCT 47.2 09/21/2017     09/21/2017     BMP:   Lab Results   Component Value Date    GLU 79 09/12/2018     09/12/2018    K 4.2 09/12/2018     09/12/2018    CO2 26 09/12/2018    BUN 15 09/12/2018    CREATININE 0.9 09/12/2018    CALCIUM 9.4 09/12/2018     Lab Results   Component Value Date    ALT 28 09/12/2018    AST 22 09/12/2018    ALKPHOS 81 09/12/2018    BILITOT 1.0 09/12/2018       Diagnostics Review:  n/a    Assessment:     1. Urachal sinus    2. Urachal cyst    3. Open wound of umbilical region, initial encounter    4. Overweight (BMI 25.0-29.9)      Plan:     The patient's physical exam and history seemed consistent with a patent urachal sinus/cyst.  We discussed potential definitive treatment for this.  Patient is interested in additional workup.  Ultrasound was ordered for further evaluation.  Could not definitively rule out a hernia at this location due to the tenderness on exam.  Could consider CT if ultrasound is nondiagnostic.  Weight loss encouraged.   Encouraged packing 2 x 2 gauze within the umbilicus (when the lesion is draining) to absorb any moisture and help prevent infection.  All the patient's questions were answered.  We will call with the results of the ultrasound and term in the next step at that time.

## 2018-10-04 ENCOUNTER — TELEPHONE (OUTPATIENT)
Dept: SURGERY | Facility: CLINIC | Age: 38
End: 2018-10-04

## 2018-10-04 RX ORDER — CLINDAMYCIN HYDROCHLORIDE 300 MG/1
300 CAPSULE ORAL EVERY 8 HOURS
Qty: 30 CAPSULE | Refills: 0 | Status: SHIPPED | OUTPATIENT
Start: 2018-10-04 | End: 2018-10-14

## 2018-10-04 NOTE — TELEPHONE ENCOUNTER
----- Message from Kailey Whitmore, DO sent at 10/4/2018  2:02 PM CDT -----  Pls call pt and let him know that his wound culture was positive for Prevotella. I sent rx for Clinda to his pharmacy. thx  10-4-18 4666  Per Dr. Whitmore, patient notified of above results/recommendations.

## 2018-10-05 ENCOUNTER — PATIENT MESSAGE (OUTPATIENT)
Dept: SURGERY | Facility: CLINIC | Age: 38
End: 2018-10-05

## 2018-10-05 ENCOUNTER — HOSPITAL ENCOUNTER (OUTPATIENT)
Dept: RADIOLOGY | Facility: HOSPITAL | Age: 38
Discharge: HOME | End: 2018-10-05
Attending: SURGERY
Payer: COMMERCIAL

## 2018-10-05 DIAGNOSIS — E66.3 OVERWEIGHT (BMI 25.0-29.9): ICD-10-CM

## 2018-10-05 DIAGNOSIS — S31.105A OPEN WOUND OF UMBILICAL REGION, INITIAL ENCOUNTER: ICD-10-CM

## 2018-10-05 DIAGNOSIS — Q64.4 URACHAL SINUS: ICD-10-CM

## 2018-10-05 DIAGNOSIS — Q64.4 URACHAL CYST: ICD-10-CM

## 2018-10-05 LAB — BACTERIA SPEC AEROBE CULT: NO GROWTH

## 2018-10-05 PROCEDURE — 76705 ECHO EXAM OF ABDOMEN: CPT | Mod: 26,,, | Performed by: RADIOLOGY

## 2018-10-05 PROCEDURE — 76705 ECHO EXAM OF ABDOMEN: CPT | Mod: TC

## 2018-10-08 LAB — BACTERIA SPEC ANAEROBE CULT: NORMAL

## 2018-10-24 ENCOUNTER — PATIENT MESSAGE (OUTPATIENT)
Dept: SURGERY | Facility: CLINIC | Age: 38
End: 2018-10-24

## 2018-10-25 ENCOUNTER — INITIAL CONSULT (OUTPATIENT)
Dept: DERMATOLOGY | Facility: CLINIC | Age: 38
End: 2018-10-25
Payer: COMMERCIAL

## 2018-10-25 VITALS — WEIGHT: 223 LBS | BODY MASS INDEX: 28.63 KG/M2

## 2018-10-25 DIAGNOSIS — B35.4 TINEA CORPORIS: Primary | ICD-10-CM

## 2018-10-25 DIAGNOSIS — L70.9 ACNE, UNSPECIFIED ACNE TYPE: ICD-10-CM

## 2018-10-25 PROCEDURE — 3008F BODY MASS INDEX DOCD: CPT | Mod: CPTII,S$GLB,, | Performed by: DERMATOLOGY

## 2018-10-25 PROCEDURE — 99999 PR PBB SHADOW E&M-EST. PATIENT-LVL III: CPT | Mod: PBBFAC,,, | Performed by: DERMATOLOGY

## 2018-10-25 PROCEDURE — 99202 OFFICE O/P NEW SF 15 MIN: CPT | Mod: 25,S$GLB,, | Performed by: DERMATOLOGY

## 2018-10-25 PROCEDURE — 87220 TISSUE EXAM FOR FUNGI: CPT | Mod: S$GLB,,, | Performed by: DERMATOLOGY

## 2018-10-25 RX ORDER — FLUCONAZOLE 200 MG/1
TABLET ORAL
Qty: 8 TABLET | Refills: 0 | Status: SHIPPED | OUTPATIENT
Start: 2018-10-25 | End: 2019-04-01 | Stop reason: SDUPTHER

## 2018-10-25 RX ORDER — CLINDAMYCIN PHOSPHATE 10 UG/ML
LOTION TOPICAL
Qty: 60 ML | Refills: 3 | Status: SHIPPED | OUTPATIENT
Start: 2018-10-25

## 2018-10-25 NOTE — LETTER
October 25, 2018      Clarke Vincent MD  [address]  [address]  [place] [address]           [place] [facility]  [address] [facility] [address]  Phone: [phone]  Fax: [phone]          Patient: Kaitlynn Chauhan   MR Number: [id number]   YOB: 1980   Date of Visit: 10/25/2018       Dear Dr. Clarke Vincent:    Thank you for referring Kaitlynn Chauhan to me for evaluation. Attached you will find relevant portions of my assessment and plan of care.    If you have questions, please do not hesitate to call me. I look forward to following Kaitlynn Chauhan along with you.    Sincerely,    Alyssia Buitrago MD    Enclosure  CC:  No Recipients    If you would like to receive this communication electronically, please contact [email or web address] or [phone] to request more information on [employer] Link access.    For providers and/or their staff who would like to refer a patient to [facility], please contact us through our one-stop-shop provider referral line, [facility] , at [phone].    If you feel you have received this communication in error or would no longer like to receive these types of communications, please e-mail [email or web address]

## 2018-10-25 NOTE — PROGRESS NOTES
Subjective:       Patient ID:  Kaitlynn Chauhan is a 37 y.o. male who presents for   Chief Complaint   Patient presents with    Rash     neck      History of Present Illness: The patient presents with chief complaint of rash.  Location: neck  Duration: months  Signs/Symptoms: none    Prior treatments: ketoconizole cream diflucan helped but has recurred          Review of Systems   Constitutional: Negative for fever.   Skin: Positive for rash. Negative for itching.   Hematologic/Lymphatic: Does not bruise/bleed easily.        Objective:    Physical Exam   Constitutional: He appears well-developed and well-nourished. No distress.   Neurological: He is alert and oriented to person, place, and time. He is not disoriented.   Psychiatric: He has a normal mood and affect.   Skin:   Areas Examined (abnormalities noted in diagram):   Scalp / Hair Palpated and Inspected  Head / Face Inspection Performed  Neck Inspection Performed  Chest / Axilla Inspection Performed  RUE Inspected  LUE Inspection Performed                  Diagram Legend     Erythematous scaling macule/papule c/w actinic keratosis       Vascular papule c/w angioma      Pigmented verrucoid papule/plaque c/w seborrheic keratosis      Yellow umbilicated papule c/w sebaceous hyperplasia      Irregularly shaped tan macule c/w lentigo     1-2 mm smooth white papules consistent with Milia      Movable subcutaneous cyst with punctum c/w epidermal inclusion cyst      Subcutaneous movable cyst c/w pilar cyst      Firm pink to brown papule c/w dermatofibroma      Pedunculated fleshy papule(s) c/w skin tag(s)      Evenly pigmented macule c/w junctional nevus     Mildly variegated pigmented, slightly irregular-bordered macule c/w mildly atypical nevus      Flesh colored to evenly pigmented papule c/w intradermal nevus       Pink pearly papule/plaque c/w basal cell carcinoma      Erythematous hyperkeratotic cursted plaque c/w SCC      Surgical scar with no sign of skin  cancer recurrence      Open and closed comedones      Inflammatory papules and pustules      Verrucoid papule consistent consistent with wart     Erythematous eczematous patches and plaques     Dystrophic onycholytic nail with subungual debris c/w onychomycosis     Umbilicated papule    Erythematous-base heme-crusted tan verrucoid plaque consistent with inflamed seborrheic keratosis     Erythematous Silvery Scaling Plaque c/w Psoriasis     See annotation      Assessment / Plan:        Tinea corporis  -     fluconazole (DIFLUCAN) 200 MG Tab; Take 2 weekly for a month  Dispense: 8 tablet; Refill: 0  lamisil cream    Acne, unspecified acne type  -     clindamycin (CLEOCIN T) 1 % lotion; Use hs on face  Dispense: 60 mL; Refill: 3  Use with differin gel hs

## 2019-04-01 ENCOUNTER — PATIENT MESSAGE (OUTPATIENT)
Dept: DERMATOLOGY | Facility: CLINIC | Age: 39
End: 2019-04-01

## 2019-04-01 ENCOUNTER — PATIENT MESSAGE (OUTPATIENT)
Dept: INTERNAL MEDICINE | Facility: CLINIC | Age: 39
End: 2019-04-01

## 2019-04-01 DIAGNOSIS — B35.4 TINEA CORPORIS: ICD-10-CM

## 2019-04-01 RX ORDER — FLUCONAZOLE 200 MG/1
TABLET ORAL
Qty: 8 TABLET | Refills: 0 | Status: SHIPPED | OUTPATIENT
Start: 2019-04-01

## 2019-04-01 RX ORDER — BETAMETHASONE DIPROPIONATE 0.5 MG/G
CREAM TOPICAL 2 TIMES DAILY
Qty: 45 G | Refills: 3 | Status: SHIPPED | OUTPATIENT
Start: 2019-04-01 | End: 2019-04-11

## 2019-04-05 ENCOUNTER — OFFICE VISIT (OUTPATIENT)
Dept: INTERNAL MEDICINE | Facility: CLINIC | Age: 39
End: 2019-04-05
Payer: COMMERCIAL

## 2019-04-05 VITALS
BODY MASS INDEX: 29.05 KG/M2 | TEMPERATURE: 98 F | OXYGEN SATURATION: 97 % | WEIGHT: 214.5 LBS | RESPIRATION RATE: 20 BRPM | HEART RATE: 84 BPM | DIASTOLIC BLOOD PRESSURE: 82 MMHG | HEIGHT: 72 IN | SYSTOLIC BLOOD PRESSURE: 128 MMHG

## 2019-04-05 DIAGNOSIS — B96.89 ACUTE BACTERIAL BRONCHITIS: Primary | ICD-10-CM

## 2019-04-05 DIAGNOSIS — J20.8 ACUTE BACTERIAL BRONCHITIS: Primary | ICD-10-CM

## 2019-04-05 PROCEDURE — 99213 PR OFFICE/OUTPT VISIT, EST, LEVL III, 20-29 MIN: ICD-10-PCS | Mod: S$GLB,,, | Performed by: FAMILY MEDICINE

## 2019-04-05 PROCEDURE — 99999 PR PBB SHADOW E&M-EST. PATIENT-LVL III: CPT | Mod: PBBFAC,,, | Performed by: FAMILY MEDICINE

## 2019-04-05 PROCEDURE — 99213 OFFICE O/P EST LOW 20 MIN: CPT | Mod: S$GLB,,, | Performed by: FAMILY MEDICINE

## 2019-04-05 PROCEDURE — 99999 PR PBB SHADOW E&M-EST. PATIENT-LVL III: ICD-10-PCS | Mod: PBBFAC,,, | Performed by: FAMILY MEDICINE

## 2019-04-05 PROCEDURE — 3008F BODY MASS INDEX DOCD: CPT | Mod: CPTII,S$GLB,, | Performed by: FAMILY MEDICINE

## 2019-04-05 PROCEDURE — 3008F PR BODY MASS INDEX (BMI) DOCUMENTED: ICD-10-PCS | Mod: CPTII,S$GLB,, | Performed by: FAMILY MEDICINE

## 2019-04-05 RX ORDER — ESCITALOPRAM OXALATE 10 MG/1
10 TABLET ORAL DAILY
Qty: 90 TABLET | Refills: 3 | Status: SHIPPED | OUTPATIENT
Start: 2019-04-05 | End: 2020-04-04

## 2019-04-08 NOTE — PROGRESS NOTES
Subjective:   Patient ID: Kaitlynn Chauhan is a 38 y.o. male.    Chief Complaint: No chief complaint on file.      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 He is ped oncologist.   Very pleasant man.     Patient queried and denies any further complaints.    ALLERGIES AND MEDICATIONS: updated and reviewed.  Review of patient's allergies indicates:  No Known Allergies    Current Outpatient Medications:     albuterol (PROVENTIL/VENTOLIN HFA) 90 mcg/actuation inhaler, Inhale 2 puffs into the lungs every 6 (six) hours as needed for Wheezing., Disp: 18 g, Rfl: 5    betamethasone dipropionate (DIPROLENE) 0.05 % cream, Apply topically 2 (two) times daily. for 10 days, Disp: 45 g, Rfl: 3    clindamycin (CLEOCIN T) 1 % lotion, Use hs on face, Disp: 60 mL, Rfl: 3    escitalopram oxalate (LEXAPRO) 10 MG tablet, Take 1 tablet (10 mg total) by mouth once daily., Disp: 90 tablet, Rfl: 3    fluconazole (DIFLUCAN) 200 MG Tab, Take 2 weekly for a month, Disp: 8 tablet, Rfl: 0    mometasone (ASMANEX TWISTHALER) 220 mcg (30 doses) inhaler, Inhale 2 puffs into the lungs once daily. Controller, Disp: 1 each, Rfl: 11    Review of Systems   Constitutional: Negative for activity change, appetite change, chills, diaphoresis, fatigue, fever and unexpected weight change.   HENT: Negative for congestion, ear discharge, ear pain, postnasal drip, rhinorrhea, sneezing and sore throat.    Eyes: Negative for photophobia and discharge.   Respiratory: Negative for cough, chest tightness, shortness of breath and wheezing.    Cardiovascular: Negative for chest pain and palpitations.   Gastrointestinal: Negative for abdominal distention, abdominal pain, diarrhea, nausea and vomiting.   Genitourinary: Negative for dysuria.   Musculoskeletal: Negative for arthralgias and neck pain.   Skin: Negative for rash.   Neurological: Negative for  headaches.       Objective:     Vitals:    04/05/19 1519   BP: 128/82   Pulse: 84   Resp: 20   Temp: 98.1 °F (36.7 °C)   TempSrc: Oral   SpO2: 97%   Weight: 97.3 kg (214 lb 8.1 oz)   Height: 6' (1.829 m)   PainSc: 0-No pain     Body mass index is 29.09 kg/m².    Physical Exam   Constitutional: He appears well-developed and well-nourished.   HENT:   Head: Normocephalic and atraumatic.   Right Ear: Hearing, tympanic membrane, external ear and ear canal normal. No drainage or swelling. No decreased hearing is noted.   Left Ear: Hearing, tympanic membrane, external ear and ear canal normal. No drainage or swelling. No decreased hearing is noted.   Nose: Nose normal. No rhinorrhea.   Mouth/Throat: Oropharynx is clear and moist. No oropharyngeal exudate, posterior oropharyngeal edema or posterior oropharyngeal erythema.   Eyes: Pupils are equal, round, and reactive to light. Conjunctivae, EOM and lids are normal. Right eye exhibits no discharge and no exudate. Left eye exhibits no discharge and no exudate. Right conjunctiva is not injected. Left conjunctiva is not injected.   Neck: Trachea normal and full passive range of motion without pain. Normal carotid pulses, no hepatojugular reflux and no JVD present. Carotid bruit is not present. No neck rigidity. No edema and no erythema present. No thyroid mass and no thyromegaly present.   Cardiovascular: Normal rate, regular rhythm and normal heart sounds.   Pulmonary/Chest: Effort normal. No respiratory distress.   Abdominal: Soft. Normal appearance and bowel sounds are normal. There is no tenderness. There is negative Damon's sign.   Lymphadenopathy:     He has no cervical adenopathy.   Neurological: He is alert.   Skin: Skin is warm and dry.   Psychiatric: He has a normal mood and affect. His speech is normal and behavior is normal.   Nursing note and vitals reviewed.      Assessment and Plan:   Diagnoses and all orders for this visit:    Acute bacterial  bronchitis    Other orders  -     escitalopram oxalate (LEXAPRO) 10 MG tablet; Take 1 tablet (10 mg total) by mouth once daily.        Follow up in about 3 months (around 7/5/2019).    THIS NOTE WILL BE SHARED WITH THE PATIENT.

## 2020-10-08 ENCOUNTER — PATIENT MESSAGE (OUTPATIENT)
Dept: PRIMARY CARE CLINIC | Facility: CLINIC | Age: 40
End: 2020-10-08

## 2021-01-04 ENCOUNTER — PATIENT MESSAGE (OUTPATIENT)
Dept: ADMINISTRATIVE | Facility: HOSPITAL | Age: 41
End: 2021-01-04

## 2021-04-05 ENCOUNTER — PATIENT MESSAGE (OUTPATIENT)
Dept: ADMINISTRATIVE | Facility: HOSPITAL | Age: 41
End: 2021-04-05

## 2021-07-06 ENCOUNTER — PATIENT MESSAGE (OUTPATIENT)
Dept: ADMINISTRATIVE | Facility: HOSPITAL | Age: 41
End: 2021-07-06

## 2021-10-05 ENCOUNTER — PATIENT MESSAGE (OUTPATIENT)
Dept: ADMINISTRATIVE | Facility: HOSPITAL | Age: 41
End: 2021-10-05

## 2022-01-18 ENCOUNTER — PATIENT MESSAGE (OUTPATIENT)
Dept: ADMINISTRATIVE | Facility: HOSPITAL | Age: 42
End: 2022-01-18
Payer: COMMERCIAL